# Patient Record
Sex: FEMALE | HISPANIC OR LATINO | Employment: UNEMPLOYED | URBAN - METROPOLITAN AREA
[De-identification: names, ages, dates, MRNs, and addresses within clinical notes are randomized per-mention and may not be internally consistent; named-entity substitution may affect disease eponyms.]

---

## 2020-04-01 ENCOUNTER — TELEMEDICINE (OUTPATIENT)
Dept: FAMILY MEDICINE CLINIC | Facility: CLINIC | Age: 65
End: 2020-04-01

## 2020-04-01 DIAGNOSIS — I15.1 HYPERTENSION SECONDARY TO OTHER RENAL DISORDERS: ICD-10-CM

## 2020-04-01 DIAGNOSIS — E03.9 HYPOTHYROIDISM, UNSPECIFIED TYPE: ICD-10-CM

## 2020-04-01 DIAGNOSIS — N28.89 HYPERTENSION SECONDARY TO OTHER RENAL DISORDERS: ICD-10-CM

## 2020-04-01 DIAGNOSIS — Z76.89 ENCOUNTER TO ESTABLISH CARE: Primary | ICD-10-CM

## 2020-04-01 PROBLEM — Q61.3 POLYCYSTIC KIDNEY DISEASE: Status: ACTIVE | Noted: 2020-04-01

## 2020-04-01 PROCEDURE — 99202 OFFICE O/P NEW SF 15 MIN: CPT | Performed by: FAMILY MEDICINE

## 2020-04-01 RX ORDER — LEVOTHYROXINE SODIUM 0.1 MG/1
100 TABLET ORAL DAILY
Qty: 30 TABLET | Refills: 2 | Status: SHIPPED | OUTPATIENT
Start: 2020-04-01 | End: 2020-07-15 | Stop reason: SDUPTHER

## 2020-04-01 RX ORDER — LOSARTAN POTASSIUM 50 MG/1
50 TABLET ORAL DAILY
Qty: 30 TABLET | Refills: 2 | Status: SHIPPED | OUTPATIENT
Start: 2020-04-01 | End: 2020-06-18 | Stop reason: SDUPTHER

## 2020-04-01 RX ORDER — LOSARTAN POTASSIUM 100 MG/1
100 TABLET ORAL DAILY
Qty: 30 TABLET | Refills: 2 | Status: SHIPPED | OUTPATIENT
Start: 2020-04-01 | End: 2020-04-01 | Stop reason: CLARIF

## 2020-06-18 ENCOUNTER — TELEPHONE (OUTPATIENT)
Dept: FAMILY MEDICINE CLINIC | Facility: CLINIC | Age: 65
End: 2020-06-18

## 2020-06-18 DIAGNOSIS — I15.1 HYPERTENSION SECONDARY TO OTHER RENAL DISORDERS: ICD-10-CM

## 2020-06-18 DIAGNOSIS — N28.89 HYPERTENSION SECONDARY TO OTHER RENAL DISORDERS: ICD-10-CM

## 2020-06-18 RX ORDER — LOSARTAN POTASSIUM 50 MG/1
50 TABLET ORAL DAILY
Qty: 30 TABLET | Refills: 2 | Status: SHIPPED | OUTPATIENT
Start: 2020-06-18 | End: 2020-07-20 | Stop reason: SDUPTHER

## 2020-07-09 ENCOUNTER — TELEPHONE (OUTPATIENT)
Dept: FAMILY MEDICINE CLINIC | Facility: CLINIC | Age: 65
End: 2020-07-09

## 2020-07-09 NOTE — TELEPHONE ENCOUNTER
Pharmacy requesting 90 day refill of Losartan  Patient last seen in April, was asked to return in 1-2 months  Please call to schedule  Thanks!

## 2020-07-14 ENCOUNTER — TELEPHONE (OUTPATIENT)
Dept: UROLOGY | Facility: HOSPITAL | Age: 65
End: 2020-07-14

## 2020-07-14 NOTE — TELEPHONE ENCOUNTER
Called and spoke with patients daughter  They needed a Monday or Tuesday so rescheduled patient for 8/24/20 in the afternoon  Patients daughter confirmed

## 2020-07-14 NOTE — TELEPHONE ENCOUNTER
Please reschedule patient with Tamar Rios on 8/19 - as patient is [place] speaking and approptiate

## 2020-07-15 DIAGNOSIS — E03.9 HYPOTHYROIDISM, UNSPECIFIED TYPE: ICD-10-CM

## 2020-07-15 RX ORDER — LEVOTHYROXINE SODIUM 0.1 MG/1
100 TABLET ORAL DAILY
Qty: 90 TABLET | Refills: 1 | Status: SHIPPED | OUTPATIENT
Start: 2020-07-15 | End: 2020-07-20 | Stop reason: SDUPTHER

## 2020-07-20 ENCOUNTER — OFFICE VISIT (OUTPATIENT)
Dept: FAMILY MEDICINE CLINIC | Facility: CLINIC | Age: 65
End: 2020-07-20

## 2020-07-20 VITALS
HEART RATE: 68 BPM | SYSTOLIC BLOOD PRESSURE: 110 MMHG | WEIGHT: 147 LBS | RESPIRATION RATE: 18 BRPM | DIASTOLIC BLOOD PRESSURE: 80 MMHG | TEMPERATURE: 98.1 F

## 2020-07-20 DIAGNOSIS — I10 HYPERTENSION, UNSPECIFIED TYPE: ICD-10-CM

## 2020-07-20 DIAGNOSIS — I15.1 HYPERTENSION SECONDARY TO OTHER RENAL DISORDERS: ICD-10-CM

## 2020-07-20 DIAGNOSIS — N28.89 HYPERTENSION SECONDARY TO OTHER RENAL DISORDERS: ICD-10-CM

## 2020-07-20 DIAGNOSIS — R92.1 BREAST CALCIFICATION SEEN ON MAMMOGRAM: ICD-10-CM

## 2020-07-20 DIAGNOSIS — Q61.3 POLYCYSTIC KIDNEY DISEASE: ICD-10-CM

## 2020-07-20 DIAGNOSIS — E03.9 HYPOTHYROIDISM, UNSPECIFIED TYPE: Primary | ICD-10-CM

## 2020-07-20 PROCEDURE — 99213 OFFICE O/P EST LOW 20 MIN: CPT | Performed by: FAMILY MEDICINE

## 2020-07-20 RX ORDER — LOSARTAN POTASSIUM 50 MG/1
50 TABLET ORAL DAILY
Qty: 30 TABLET | Refills: 2 | Status: SHIPPED | OUTPATIENT
Start: 2020-07-20 | End: 2020-09-08 | Stop reason: SDUPTHER

## 2020-07-20 RX ORDER — LEVOTHYROXINE SODIUM 0.1 MG/1
100 TABLET ORAL DAILY
Qty: 90 TABLET | Refills: 1 | Status: SHIPPED | OUTPATIENT
Start: 2020-07-20 | End: 2020-09-17 | Stop reason: SDUPTHER

## 2020-07-20 NOTE — ASSESSMENT & PLAN NOTE
- Pt reports that she had a mammogram over 2 years ago which showed bilateral breast calcifications for which she was supposed to get repeat imaging done in 6 months  - Will order mammogram at this time

## 2020-07-20 NOTE — ASSESSMENT & PLAN NOTE
- History of hypothyroidism  - Asymptomatic, on 100 mcg dose of levothyroxine  - No recent TSH, will order at this visit and order refill on medication  - Follow-up in 6 months or sooner depending on TSH level

## 2020-07-20 NOTE — PROGRESS NOTES
Assessment/Plan:    Hypothyroidism  - History of hypothyroidism  - Asymptomatic, on 100 mcg dose of levothyroxine  - No recent TSH, will order at this visit and order refill on medication  - Follow-up in 6 months or sooner depending on TSH level    Breast calcification seen on mammogram  - Pt reports that she had a mammogram over 2 years ago which showed bilateral breast calcifications for which she was supposed to get repeat imaging done in 6 months  - Will order mammogram at this time     Polycystic kidney disease  - Hx of surgeries and shunt placement for PCKD  - Currently asymptomatic, no dysuria or hematuria  - Was seen by Urology in the past  - Pt has apt with Urology scheduled for 08/24/2020    Hypertension  - Essential HTN vs 2/2 renal disorder  - BP today 110/80  - Home meds: Losartan 50 mg daily  - There is no height or weight on file to calculate BMI  - Last basic metabolic panel , urine protein/ microalbumin: none on fiel   - Will refill Losartan at this time as BP is well-controlled, will also order BMP to check kidney function and electrolytes   - Counseled to continue watching salt intake, encouraged exercise daily         Diagnoses and all orders for this visit:    Hypothyroidism, unspecified type  -     TSH, 3rd generation; Future  -     levothyroxine 100 mcg tablet; Take 1 tablet (100 mcg total) by mouth daily    Hypertension, unspecified type  -     Basic metabolic panel; Future    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 renal disorders  -     losartan (COZAAR) 50 mg tablet; Take 1 tablet (50 mg total) by mouth daily    Breast calcification seen on mammogram  -     Mammo screening bilateral w 3d & cad; Future    Polycystic kidney disease          Subjective:      Patient ID: Rico Gomez is a 71 y o  female  16-year-old female coming in for medication refills of her levothyroxine and losartan  She reports that she has had no problems on her current dosages of both medications    She says that she has had no weight loss/weight gain, feelings of being too hot or too cold, fatigue, or constipation  She denies any chest pain, shortness of breath, headache or dizziness at this time as well  She does bring up that she follows with urology for polycystic kidney disease and that she has an appointment for in August   She says that she sometimes does have CVA tenderness due to her kidney pain but currently is having none  She denies any dysuria, difficulty urinating, or hematuria  She brings up that she was told she had breast calcifications and both of her breast over 2 years ago and was supposed to follow-up with a mammogram but never got the paperwork and has yet to get 1  She denies any breast tenderness or pain at this time  The following portions of the patient's history were reviewed and updated as appropriate: allergies, current medications, past family history, past medical history, past social history, past surgical history and problem list     Review of Systems   Constitutional: Negative for chills, fatigue and fever  HENT: Negative for rhinorrhea and sneezing  Eyes: Negative  Respiratory: Negative for cough and shortness of breath  Cardiovascular: Negative for chest pain, palpitations and leg swelling  Gastrointestinal: Negative for abdominal pain, blood in stool, diarrhea and nausea  Genitourinary: Negative for difficulty urinating, dysuria and hematuria  Musculoskeletal: Negative for arthralgias and back pain  Skin: Negative for rash and wound  Neurological: Negative for dizziness, syncope, facial asymmetry, weakness, light-headedness and headaches  Psychiatric/Behavioral: Negative for agitation and confusion  The patient is not nervous/anxious            Objective:      /80 (BP Location: Left arm, Patient Position: Sitting, Cuff Size: Large)   Pulse 68   Temp 98 1 °F (36 7 °C) (Tympanic)   Resp 18   Wt 66 7 kg (147 lb)          Physical Exam Constitutional: She is oriented to person, place, and time  She appears well-developed and well-nourished  HENT:   Head: Normocephalic and atraumatic  Nose: Nose normal    Mouth/Throat: Oropharynx is clear and moist    Eyes: Pupils are equal, round, and reactive to light  Conjunctivae and EOM are normal    Cardiovascular: Normal rate, regular rhythm, normal heart sounds and intact distal pulses  No murmur heard  Pulmonary/Chest: Effort normal and breath sounds normal    Abdominal: Soft  Bowel sounds are normal    Musculoskeletal: Normal range of motion  She exhibits no edema or tenderness  Neurological: She is alert and oriented to person, place, and time  No cranial nerve deficit  Skin: Skin is warm and dry  Capillary refill takes less than 2 seconds  Psychiatric: She has a normal mood and affect   Her behavior is normal

## 2020-07-20 NOTE — ASSESSMENT & PLAN NOTE
- Hx of surgeries and shunt placement for PCKD  - Currently asymptomatic, no dysuria or hematuria  - Was seen by Urology in the past  - Pt has apt with Urology scheduled for 08/24/2020

## 2020-07-20 NOTE — ASSESSMENT & PLAN NOTE
- Essential HTN vs 2/2 renal disorder  - BP today 110/80  - Home meds: Losartan 50 mg daily  - There is no height or weight on file to calculate BMI    - Last basic metabolic panel , urine protein/ microalbumin: none on fiel   - Will refill Losartan at this time as BP is well-controlled, will also order BMP to check kidney function and electrolytes   - Counseled to continue watching salt intake, encouraged exercise daily

## 2020-08-08 ENCOUNTER — APPOINTMENT (OUTPATIENT)
Dept: LAB | Age: 65
End: 2020-08-08
Payer: MEDICARE

## 2020-08-08 DIAGNOSIS — E03.9 HYPOTHYROIDISM, UNSPECIFIED TYPE: ICD-10-CM

## 2020-08-08 DIAGNOSIS — I10 HYPERTENSION, UNSPECIFIED TYPE: ICD-10-CM

## 2020-08-08 LAB
ANION GAP SERPL CALCULATED.3IONS-SCNC: 5 MMOL/L (ref 4–13)
BUN SERPL-MCNC: 24 MG/DL (ref 5–25)
CALCIUM SERPL-MCNC: 9.4 MG/DL (ref 8.3–10.1)
CHLORIDE SERPL-SCNC: 110 MMOL/L (ref 100–108)
CO2 SERPL-SCNC: 26 MMOL/L (ref 21–32)
CREAT SERPL-MCNC: 1.39 MG/DL (ref 0.6–1.3)
GFR SERPL CREATININE-BSD FRML MDRD: 40 ML/MIN/1.73SQ M
GLUCOSE P FAST SERPL-MCNC: 95 MG/DL (ref 65–99)
POTASSIUM SERPL-SCNC: 4.3 MMOL/L (ref 3.5–5.3)
SODIUM SERPL-SCNC: 141 MMOL/L (ref 136–145)
TSH SERPL DL<=0.05 MIU/L-ACNC: 1.22 UIU/ML (ref 0.36–3.74)

## 2020-08-08 PROCEDURE — 80048 BASIC METABOLIC PNL TOTAL CA: CPT

## 2020-08-08 PROCEDURE — 36415 COLL VENOUS BLD VENIPUNCTURE: CPT

## 2020-08-08 PROCEDURE — 84443 ASSAY THYROID STIM HORMONE: CPT

## 2020-08-10 DIAGNOSIS — I15.1 HYPERTENSION SECONDARY TO OTHER RENAL DISORDERS: ICD-10-CM

## 2020-08-10 DIAGNOSIS — N28.89 HYPERTENSION SECONDARY TO OTHER RENAL DISORDERS: ICD-10-CM

## 2020-08-10 RX ORDER — LOSARTAN POTASSIUM 50 MG/1
50 TABLET ORAL DAILY
Qty: 90 TABLET | Refills: 1 | OUTPATIENT
Start: 2020-08-10

## 2020-08-24 ENCOUNTER — OFFICE VISIT (OUTPATIENT)
Dept: UROLOGY | Facility: AMBULATORY SURGERY CENTER | Age: 65
End: 2020-08-24
Payer: MEDICARE

## 2020-08-24 ENCOUNTER — TELEPHONE (OUTPATIENT)
Dept: UROLOGY | Facility: AMBULATORY SURGERY CENTER | Age: 65
End: 2020-08-24

## 2020-08-24 VITALS
HEART RATE: 69 BPM | DIASTOLIC BLOOD PRESSURE: 68 MMHG | WEIGHT: 147 LBS | TEMPERATURE: 97.5 F | RESPIRATION RATE: 18 BRPM | HEIGHT: 65 IN | SYSTOLIC BLOOD PRESSURE: 110 MMHG | BODY MASS INDEX: 24.49 KG/M2

## 2020-08-24 DIAGNOSIS — N20.0 NEPHROLITHIASIS: Primary | ICD-10-CM

## 2020-08-24 LAB
SL AMB  POCT GLUCOSE, UA: NORMAL
SL AMB LEUKOCYTE ESTERASE,UA: NORMAL
SL AMB POCT BILIRUBIN,UA: NORMAL
SL AMB POCT BLOOD,UA: NORMAL
SL AMB POCT CLARITY,UA: CLEAR
SL AMB POCT COLOR,UA: YELLOW
SL AMB POCT KETONES,UA: NORMAL
SL AMB POCT NITRITE,UA: NORMAL
SL AMB POCT PH,UA: 5
SL AMB POCT SPECIFIC GRAVITY,UA: 1.01
SL AMB POCT URINE PROTEIN: NORMAL
SL AMB POCT UROBILINOGEN: 0.2

## 2020-08-24 PROCEDURE — 1036F TOBACCO NON-USER: CPT | Performed by: NURSE PRACTITIONER

## 2020-08-24 PROCEDURE — 81002 URINALYSIS NONAUTO W/O SCOPE: CPT | Performed by: NURSE PRACTITIONER

## 2020-08-24 PROCEDURE — 99203 OFFICE O/P NEW LOW 30 MIN: CPT | Performed by: NURSE PRACTITIONER

## 2020-08-24 PROCEDURE — 3008F BODY MASS INDEX DOCD: CPT | Performed by: NURSE PRACTITIONER

## 2020-08-24 NOTE — TELEPHONE ENCOUNTER
Follow-up disposition: Return in about 2 weeks (around 9/7/2020) for Recheck- ct scan  Please advise on spot with Shobha Calvo  Patients CT is on 9/1

## 2020-08-24 NOTE — PROGRESS NOTES
8/24/2020      Chief Complaint   Patient presents with    Nephrolithiasis     history of      Assessment and Plan    71 y o  female managed by NEW PATIENT    1  Nephrolithiasis  · CT stone study ordered  · Maintain adequate hydration upwards to 40-60 oz per day  · Dietary behavior modifications discussed  · AUA kidney stone patient education packet provided  · Follow up in the office in 2 weeks    History of Present Illness  Korina Cuenca is a 71 y o  female here for follow up evaluation of  nephrolithiasis  Patient with a known history of nephrolithiasis  She also has a history of  multiple  instrumentation secondary to calculi  Does have a history of renal calculi, hypertension, chronic kidney disease and hypothyroidism  Patient comes to the office today with complaints of left flank pain and discomfort  She complains of occasional burning with urination but denies all other lower urinary tract symptoms  She reports sensation of complete bladder emptying with urination  She denies fever and chills  Review of Systems   Constitutional: Negative for chills and fever  Respiratory: Negative for cough and shortness of breath  Cardiovascular: Negative for chest pain  Gastrointestinal: Negative for abdominal distention, abdominal pain, blood in stool, nausea and vomiting  Genitourinary: Positive for flank pain (Left)  Negative for difficulty urinating, dysuria, enuresis, frequency, hematuria and urgency  Musculoskeletal: Negative for back pain  Skin: Negative for rash  Neurological: Negative for dizziness  Past Medical History  Past Medical History:   Diagnosis Date    Chronic kidney disease     Disease of thyroid gland     Hypertension     Kidney stone        Past Social History  History reviewed  No pertinent surgical history    Social History     Tobacco Use   Smoking Status Never Smoker   Smokeless Tobacco Never Used       Past Family History  Family History   Problem Relation Age of Onset    Cancer Mother     Coronary artery disease Father     Thyroid disease Sister        Past Social history  Social History     Socioeconomic History    Marital status: Single     Spouse name: Not on file    Number of children: Not on file    Years of education: Not on file    Highest education level: Not on file   Occupational History    Not on file   Social Needs    Financial resource strain: Not on file    Food insecurity     Worry: Not on file     Inability: Not on file    Transportation needs     Medical: Not on file     Non-medical: Not on file   Tobacco Use    Smoking status: Never Smoker    Smokeless tobacco: Never Used   Substance and Sexual Activity    Alcohol use: Never     Frequency: Never    Drug use: Never    Sexual activity: Not on file   Lifestyle    Physical activity     Days per week: Not on file     Minutes per session: Not on file    Stress: Not on file   Relationships    Social connections     Talks on phone: Not on file     Gets together: Not on file     Attends Faith service: Not on file     Active member of club or organization: Not on file     Attends meetings of clubs or organizations: Not on file     Relationship status: Not on file    Intimate partner violence     Fear of current or ex partner: Not on file     Emotionally abused: Not on file     Physically abused: Not on file     Forced sexual activity: Not on file   Other Topics Concern    Not on file   Social History Narrative    Not on file       Current Medications  Current Outpatient Medications   Medication Sig Dispense Refill    levothyroxine 100 mcg tablet Take 1 tablet (100 mcg total) by mouth daily 90 tablet 1    losartan (COZAAR) 50 mg tablet Take 1 tablet (50 mg total) by mouth daily 30 tablet 2     No current facility-administered medications for this visit          Allergies  Allergies   Allergen Reactions    Coconut Oil     Latex     Pork-Derived Products     Urofollitropin The following portions of the patient's history were reviewed and updated as appropriate: allergies, current medications, past medical history, past social history, past surgical history and problem list       Vitals  Vitals:    08/24/20 1348   BP: 110/68   BP Location: Left arm   Patient Position: Sitting   Cuff Size: Adult   Pulse: 69   Resp: 18   Temp: 97 5 °F (36 4 °C)   Weight: 66 7 kg (147 lb)   Height: 5' 4 96" (1 65 m)     Physical Exam  Physical Exam  Vitals signs reviewed  Constitutional:       Appearance: Normal appearance  Cardiovascular:      Rate and Rhythm: Normal rate and regular rhythm  Pulses: Normal pulses  Heart sounds: Normal heart sounds  No murmur  Pulmonary:      Effort: Pulmonary effort is normal  No respiratory distress  Breath sounds: Normal breath sounds  Abdominal:      Tenderness: There is left CVA tenderness  Skin:     General: Skin is warm and dry  Neurological:      General: No focal deficit present  Mental Status: She is alert and oriented to person, place, and time     Psychiatric:         Mood and Affect: Mood normal          Results  Recent Results (from the past 1 hour(s))   POCT urine dip    Collection Time: 08/24/20  1:50 PM   Result Value Ref Range    LEUKOCYTE ESTERASE,UA -     NITRITE,UA -     SL AMB POCT UROBILINOGEN 0 2     POCT URINE PROTEIN trace      PH,UA 5 0     BLOOD,UA -     SPECIFIC GRAVITY,UA 1 010     KETONES,UA -     BILIRUBIN,UA -     GLUCOSE, UA -      COLOR,UA yellow     CLARITY,UA clear    ]  No results found for: PSA  Lab Results   Component Value Date    CALCIUM 9 4 08/08/2020    K 4 3 08/08/2020    CO2 26 08/08/2020     (H) 08/08/2020    BUN 24 08/08/2020    CREATININE 1 39 (H) 08/08/2020     No results found for: WBC, HGB, HCT, MCV, PLT    Orders  Orders Placed This Encounter   Procedures    CT renal stone study abdomen pelvis wo contrast     Standing Status:   Future     Standing Expiration Date: 8/24/2024     Scheduling Instructions:      Nothing to eat 3 hours prior to your test   Clear liquids are also permitted up until the time of the scan  Clear liquids include water, black coffee or tea, apple juice or clear broth  If possible wear clothing without any metal in the abdomen area  Sweat suit, shorts, sports bra or bra without underwire may eliminate the need to change  Please bring your insurance cards, a form of photo ID and a list of your medications with you  Arrive 15 minutes prior to your appointment time in order to register  On the day of your test, please bring any prior CT or MRI studies of this area with you that were not performed at a [facility]  To schedule this appointment, please contact Central Scheduling at [phone]  Order Specific Question:   What is the patient's sedation requirement?      Answer:   No Sedation    POCT urine dip       NAZANIN Winn

## 2020-08-24 NOTE — PATIENT INSTRUCTIONS
CT stone study with follow up in the office in 2 weeks  Adequate hydration up to 40 oz per day  Call the office for concerns or questions

## 2020-08-24 NOTE — TELEPHONE ENCOUNTER
We have no appointment in [place] for any providers until October    Can this patient be called with results or double booked as a Televisit

## 2020-09-01 ENCOUNTER — HOSPITAL ENCOUNTER (OUTPATIENT)
Dept: RADIOLOGY | Facility: HOSPITAL | Age: 65
Discharge: HOME | End: 2020-09-01
Payer: MEDICARE

## 2020-09-01 ENCOUNTER — TRANSCRIBE ORDERS (OUTPATIENT)
Dept: RADIOLOGY | Facility: HOSPITAL | Age: 65
End: 2020-09-01

## 2020-09-01 DIAGNOSIS — N20.0 NEPHROLITHIASIS: ICD-10-CM

## 2020-09-01 PROCEDURE — G1004 CDSM NDSC: HCPCS

## 2020-09-01 PROCEDURE — 74176 CT ABD & PELVIS W/O CONTRAST: CPT

## 2020-09-08 DIAGNOSIS — I15.1 HYPERTENSION SECONDARY TO OTHER RENAL DISORDERS: ICD-10-CM

## 2020-09-08 DIAGNOSIS — N28.89 HYPERTENSION SECONDARY TO OTHER RENAL DISORDERS: ICD-10-CM

## 2020-09-08 RX ORDER — LOSARTAN POTASSIUM 50 MG/1
50 TABLET ORAL DAILY
Qty: 90 TABLET | Refills: 1 | Status: SHIPPED | OUTPATIENT
Start: 2020-09-08

## 2020-09-17 ENCOUNTER — OFFICE VISIT (OUTPATIENT)
Dept: UROLOGY | Facility: MEDICAL CENTER | Age: 65
End: 2020-09-17
Payer: MEDICARE

## 2020-09-17 ENCOUNTER — TELEPHONE (OUTPATIENT)
Dept: UROLOGY | Facility: MEDICAL CENTER | Age: 65
End: 2020-09-17

## 2020-09-17 VITALS
SYSTOLIC BLOOD PRESSURE: 130 MMHG | DIASTOLIC BLOOD PRESSURE: 80 MMHG | HEART RATE: 68 BPM | BODY MASS INDEX: 24.49 KG/M2 | WEIGHT: 147 LBS | HEIGHT: 65 IN | TEMPERATURE: 98.3 F

## 2020-09-17 DIAGNOSIS — E03.9 HYPOTHYROIDISM, UNSPECIFIED TYPE: ICD-10-CM

## 2020-09-17 DIAGNOSIS — N20.0 NEPHROLITHIASIS: ICD-10-CM

## 2020-09-17 DIAGNOSIS — N20.0 NEPHROLITHIASIS: Primary | ICD-10-CM

## 2020-09-17 DIAGNOSIS — N28.1 RENAL CYST: Primary | ICD-10-CM

## 2020-09-17 DIAGNOSIS — N28.9 RENAL INSUFFICIENCY: ICD-10-CM

## 2020-09-17 PROCEDURE — 99215 OFFICE O/P EST HI 40 MIN: CPT | Performed by: NURSE PRACTITIONER

## 2020-09-17 RX ORDER — LEVOTHYROXINE SODIUM 0.1 MG/1
100 TABLET ORAL DAILY
Qty: 30 TABLET | Refills: 0 | Status: SHIPPED | OUTPATIENT
Start: 2020-09-17

## 2020-09-17 NOTE — PROGRESS NOTES
9/17/2020      Chief Complaint   Patient presents with    Nephrolithiasis    Kidney Cyst     Assessment and Plan    76 y o  female managed by Dr Jj Maciel    1  Nephrolithiasis  · CT stone study ordered and performed 09/01/2020 reveals no stone or hydronephrosis-reviewed images with patient and family  · Dietary behavior modifications discussed to reduce future stone formation  · ER precautions discussed  · Maintain adequate hydration upwards to 40-60 oz per day    2  Bilateral renal cysts  · As noted on CT abdomen pelvis stone study 09/01/2020  · CT renal protocol ordered for further evaluation of renal cyst to be performed in 2 weeks  · BMP prior to CT    I have spent 40  minutes with Patient and family today in which greater than 50% of this time was spent in counseling/coordination of care regarding Diagnostic results and Intructions for management  History of Present Illness  Korina Schneider is a 76 y o  female here for follow up evaluation of  nephrolithiasis  Patient with a known history of nephrolithiasis  She also has a history of  multiple  instrumentation secondary to calculi  Does have a history of renal calculi, hypertension, chronic kidney disease and hypothyroidism  Patient comes to the office today with complaints of left flank pain and discomfort  She complains of occasional burning with urination but denies all other lower urinary tract symptoms  She reports sensation of complete bladder emptying with urination  She denies fever and chills        Review of Systems   Constitutional: Negative for chills and fever  Respiratory: Negative for cough and shortness of breath  Cardiovascular: Negative for chest pain  Gastrointestinal: Negative for abdominal distention, abdominal pain, blood in stool, nausea and vomiting  Genitourinary: Positive for flank pain  Negative for difficulty urinating, dysuria, enuresis, frequency, hematuria and urgency  Skin: Negative for rash  Past Medical History  Past Medical History:   Diagnosis Date    Chronic kidney disease     Disease of thyroid gland     Hypertension     Kidney stone        Past Social History  No past surgical history on file    Social History     Tobacco Use   Smoking Status Never Smoker   Smokeless Tobacco Never Used       Past Family History  Family History   Problem Relation Age of Onset    Cancer Mother     Coronary artery disease Father     Thyroid disease Sister        Past Social history  Social History     Socioeconomic History    Marital status: Single     Spouse name: Not on file    Number of children: Not on file    Years of education: Not on file    Highest education level: Not on file   Occupational History    Not on file   Social Needs    Financial resource strain: Not on file    Food insecurity     Worry: Not on file     Inability: Not on file    Transportation needs     Medical: Not on file     Non-medical: Not on file   Tobacco Use    Smoking status: Never Smoker    Smokeless tobacco: Never Used   Substance and Sexual Activity    Alcohol use: Never     Frequency: Never    Drug use: Never    Sexual activity: Not on file   Lifestyle    Physical activity     Days per week: Not on file     Minutes per session: Not on file    Stress: Not on file   Relationships    Social connections     Talks on phone: Not on file     Gets together: Not on file     Attends Sikh service: Not on file     Active member of club or organization: Not on file     Attends meetings of clubs or organizations: Not on file     Relationship status: Not on file    Intimate partner violence     Fear of current or ex partner: Not on file     Emotionally abused: Not on file     Physically abused: Not on file     Forced sexual activity: Not on file   Other Topics Concern    Not on file   Social History Narrative    Not on file       Current Medications  Current Outpatient Medications   Medication Sig Dispense Refill  levothyroxine 100 mcg tablet Take 1 tablet (100 mcg total) by mouth daily 90 tablet 1    losartan (COZAAR) 50 mg tablet Take 1 tablet (50 mg total) by mouth daily 90 tablet 1     No current facility-administered medications for this visit  Allergies  Allergies   Allergen Reactions    Coconut Oil     Latex     Pork-Derived Products     Urofollitropin          The following portions of the patient's history were reviewed and updated as appropriate: allergies, current medications, past medical history, past social history, past surgical history and problem list       Vitals  Vitals:    09/17/20 1401   BP: 130/80   Pulse: 68   Temp: 98 3 °F (36 8 °C)   Weight: 66 7 kg (147 lb)   Height: 5' 4 96" (1 65 m)     Physical Exam  Physical Exam  Vitals signs reviewed  Constitutional:       General: She is not in acute distress  Appearance: Normal appearance  Eyes:      Pupils: Pupils are equal, round, and reactive to light  Cardiovascular:      Rate and Rhythm: Normal rate and regular rhythm  Heart sounds: Normal heart sounds  Pulmonary:      Effort: Pulmonary effort is normal  No respiratory distress  Breath sounds: Normal breath sounds  Abdominal:      Tenderness: There is left CVA tenderness  Musculoskeletal: Normal range of motion  Skin:     General: Skin is warm and dry  Neurological:      General: No focal deficit present  Mental Status: She is alert  Psychiatric:         Mood and Affect: Mood normal          Behavior: Behavior normal            Results  No results found for this or any previous visit (from the past 1 hour(s))  ]  No results found for: PSA  Lab Results   Component Value Date    CALCIUM 9 4 08/08/2020    K 4 3 08/08/2020    CO2 26 08/08/2020     (H) 08/08/2020    BUN 24 08/08/2020    CREATININE 1 39 (H) 08/08/2020     No results found for: WBC, HGB, HCT, MCV, PLT        Orders  Orders Placed This Encounter   Procedures    CT renal protocol Standing Status:   Future     Standing Expiration Date:   9/17/2024     Scheduling Instructions:      Nothing to eat 3 hours prior to your test   Clear liquids are also permitted up until the time of the scan  Clear liquids include water, black coffee or tea, apple juice or clear broth  If possible wear clothing without any metal in the abdomen area  Sweat suit, shorts, sports bra or bra without underwire may eliminate the need to change  Please bring your insurance cards, a form of photo ID and a list of your medications with you  Arrive 15 minutes prior to your appointment time in order to register  On the day of your test, please bring any prior CT or MRI studies of this area with you that were not performed at a [facility]  To schedule this appointment, please contact Central Scheduling at [phone]  Order Specific Question:   What is the patient's sedation requirement? Answer:   No Sedation     Order Specific Question:   Contrast information:     Answer:   IV     Order Specific Question:   Did the patient ever have a reaction to x-ray dye? If yes, please verify the type of allergy and order the contrast allergy prep  Answer:   No    Basic metabolic panel     This is a patient instruction: Patient fasting for 8 hours or longer recommended       Standing Status:   Future     Standing Expiration Date:   9/17/2021       NAZANIN Vickers

## 2020-09-17 NOTE — TELEPHONE ENCOUNTER
Please advise patient that I directly spoke with a physician regarding her case and findings on the CT stone study  He feels that a CT renal protocol will not be especially helpful at finding anything that the prior CT scan stone study performed in the beginning of the month Will fine  If there is a way to obtain the action will CT scan images from the physician in [place] that will be very helpful for the radiologist to do a comparison study of the most current CT scan verses her previous 1  I would also like for her to follow-up with a kidney specialist-nephrologist, for further evaluation given the fact that there are so many calcified cyst within the kidney with mild renal insufficiency  Referral placed  I will discontinue the CT renal protocol study and further imaging will be ordered by Nephrology as warranted

## 2020-09-23 NOTE — TELEPHONE ENCOUNTER
Call placed to patient  LMOM for her to contact the office in regards to recommendation of CRNP at this time  Office number was provided for call back at this time

## 2020-09-26 ENCOUNTER — HOSPITAL ENCOUNTER (OUTPATIENT)
Dept: MAMMOGRAPHY | Facility: CLINIC | Age: 65
Discharge: HOME | End: 2020-09-26
Payer: MEDICARE

## 2020-09-26 VITALS — HEIGHT: 64 IN | BODY MASS INDEX: 25.1 KG/M2 | WEIGHT: 147 LBS

## 2020-09-26 DIAGNOSIS — R92.1 BREAST CALCIFICATION SEEN ON MAMMOGRAM: ICD-10-CM

## 2020-09-26 DIAGNOSIS — Z12.31 ENCOUNTER FOR SCREENING MAMMOGRAM FOR MALIGNANT NEOPLASM OF BREAST: ICD-10-CM

## 2020-09-26 PROCEDURE — 77067 SCR MAMMO BI INCL CAD: CPT

## 2020-09-26 PROCEDURE — 77063 BREAST TOMOSYNTHESIS BI: CPT

## 2020-09-28 NOTE — TELEPHONE ENCOUNTER
Call placed to Pt with the advise of NAZANIN and Pt states she will call the specialist and call us back

## 2020-09-30 NOTE — TELEPHONE ENCOUNTER
Patients daughter n law called and I verified with the patient if I can speak with her and she said yes  I read the notation from [facility] and they are going to call nephrology to make an appointment and she will get the CT records and fax to our office  I told her if she had any other questions to please call us back

## 2020-10-28 ENCOUNTER — HOSPITAL ENCOUNTER (OUTPATIENT)
Dept: MAMMOGRAPHY | Facility: CLINIC | Age: 65
Discharge: HOME | End: 2020-10-28
Payer: MEDICARE

## 2020-10-28 ENCOUNTER — HOSPITAL ENCOUNTER (OUTPATIENT)
Dept: ULTRASOUND IMAGING | Facility: CLINIC | Age: 65
Discharge: HOME | End: 2020-10-28
Payer: MEDICARE

## 2020-10-28 DIAGNOSIS — R92.8 ABNORMAL MAMMOGRAM: ICD-10-CM

## 2020-10-28 PROCEDURE — 77065 DX MAMMO INCL CAD UNI: CPT

## 2020-10-28 PROCEDURE — G0279 TOMOSYNTHESIS, MAMMO: HCPCS

## 2020-10-28 PROCEDURE — 76642 ULTRASOUND BREAST LIMITED: CPT

## 2021-02-09 ENCOUNTER — TRANSCRIBE ORDERS (OUTPATIENT)
Dept: ADMINISTRATIVE | Facility: HOSPITAL | Age: 66
End: 2021-02-09

## 2021-02-09 DIAGNOSIS — R92.8 ABNORMAL MAMMOGRAM: Primary | ICD-10-CM

## 2021-04-28 ENCOUNTER — HOSPITAL ENCOUNTER (OUTPATIENT)
Dept: ULTRASOUND IMAGING | Facility: CLINIC | Age: 66
Discharge: HOME | End: 2021-04-28
Payer: MEDICARE

## 2021-04-28 ENCOUNTER — HOSPITAL ENCOUNTER (OUTPATIENT)
Dept: MAMMOGRAPHY | Facility: CLINIC | Age: 66
Discharge: HOME | End: 2021-04-28
Payer: MEDICARE

## 2021-04-28 VITALS — WEIGHT: 147 LBS | BODY MASS INDEX: 25.1 KG/M2 | HEIGHT: 64 IN

## 2021-04-28 DIAGNOSIS — R92.8 ABNORMAL MAMMOGRAM: ICD-10-CM

## 2021-04-28 PROCEDURE — G0279 TOMOSYNTHESIS, MAMMO: HCPCS

## 2021-04-28 PROCEDURE — 77065 DX MAMMO INCL CAD UNI: CPT

## 2021-04-28 PROCEDURE — 76642 ULTRASOUND BREAST LIMITED: CPT

## 2021-10-19 ENCOUNTER — TELEPHONE (OUTPATIENT)
Dept: OTHER | Facility: OTHER | Age: 66
End: 2021-10-19